# Patient Record
Sex: FEMALE | Race: WHITE
[De-identification: names, ages, dates, MRNs, and addresses within clinical notes are randomized per-mention and may not be internally consistent; named-entity substitution may affect disease eponyms.]

---

## 2017-04-01 ENCOUNTER — HOSPITAL ENCOUNTER (EMERGENCY)
Dept: HOSPITAL 61 - ER | Age: 32
Discharge: HOME | End: 2017-04-01
Payer: SELF-PAY

## 2017-04-01 VITALS — DIASTOLIC BLOOD PRESSURE: 80 MMHG | SYSTOLIC BLOOD PRESSURE: 131 MMHG

## 2017-04-01 VITALS — BODY MASS INDEX: 30.49 KG/M2 | WEIGHT: 183 LBS | HEIGHT: 65 IN

## 2017-04-01 DIAGNOSIS — Z90.710: ICD-10-CM

## 2017-04-01 DIAGNOSIS — Z90.711: ICD-10-CM

## 2017-04-01 DIAGNOSIS — G89.29: ICD-10-CM

## 2017-04-01 DIAGNOSIS — Z98.51: ICD-10-CM

## 2017-04-01 DIAGNOSIS — R11.0: ICD-10-CM

## 2017-04-01 DIAGNOSIS — R10.11: Primary | ICD-10-CM

## 2017-04-01 DIAGNOSIS — F12.10: ICD-10-CM

## 2017-04-01 LAB
ALBUMIN SERPL-MCNC: 4.5 G/DL (ref 3.4–5)
ALP SERPL-CCNC: 64 U/L (ref 46–116)
ALT SERPL-CCNC: 35 U/L (ref 14–59)
ANION GAP SERPL CALC-SCNC: 8 MMOL/L (ref 6–14)
AST SERPL-CCNC: 17 U/L (ref 15–37)
BACTERIA #/AREA URNS HPF: (no result) /HPF
BASOPHILS # BLD AUTO: 0.1 X10^3/UL (ref 0–0.2)
BASOPHILS NFR BLD: 1 % (ref 0–3)
BILIRUB DIRECT SERPL-MCNC: 0.2 MG/DL (ref 0–0.2)
BILIRUB SERPL-MCNC: 0.9 MG/DL (ref 0.2–1)
BILIRUB UR QL STRIP: NEGATIVE
BUN SERPL-MCNC: 11 MG/DL (ref 7–20)
CALCIUM SERPL-MCNC: 9.3 MG/DL (ref 8.5–10.1)
CHLORIDE SERPL-SCNC: 101 MMOL/L (ref 98–107)
CO2 SERPL-SCNC: 29 MMOL/L (ref 21–32)
CREAT SERPL-MCNC: 0.8 MG/DL (ref 0.6–1)
EOSINOPHIL NFR BLD: 2 % (ref 0–3)
ERYTHROCYTE [DISTWIDTH] IN BLOOD BY AUTOMATED COUNT: 13.2 % (ref 11.5–14.5)
GFR SERPLBLD BASED ON 1.73 SQ M-ARVRAT: 83.1 ML/MIN
GLUCOSE SERPL-MCNC: 101 MG/DL (ref 70–99)
GLUCOSE UR STRIP-MCNC: NEGATIVE MG/DL
HCT VFR BLD CALC: 42.4 % (ref 36–47)
HGB BLD-MCNC: 14.7 G/DL (ref 12–15.5)
LYMPHOCYTES # BLD: 3.1 X10^3/UL (ref 1–4.8)
LYMPHOCYTES NFR BLD AUTO: 35 % (ref 24–48)
MCH RBC QN AUTO: 32 PG (ref 25–35)
MCHC RBC AUTO-ENTMCNC: 35 G/DL (ref 31–37)
MCV RBC AUTO: 93 FL (ref 79–100)
MONOCYTES NFR BLD: 9 % (ref 0–9)
NEUTROPHILS NFR BLD AUTO: 54 % (ref 31–73)
NITRITE UR QL STRIP: NEGATIVE
PH UR STRIP: 6.5 [PH]
PLATELET # BLD AUTO: 277 X10^3/UL (ref 140–400)
POTASSIUM SERPL-SCNC: 3.7 MMOL/L (ref 3.5–5.1)
PROT SERPL-MCNC: 7.7 G/DL (ref 6.4–8.2)
PROT UR STRIP-MCNC: NEGATIVE MG/DL
RBC # BLD AUTO: 4.55 X10^6/UL (ref 3.5–5.4)
RBC #/AREA URNS HPF: (no result) /HPF (ref 0–2)
SODIUM SERPL-SCNC: 138 MMOL/L (ref 136–145)
SP GR UR STRIP: 1.01
SQUAMOUS #/AREA URNS LPF: (no result) /LPF
UROBILINOGEN UR-MCNC: 1 MG/DL
WBC # BLD AUTO: 8.9 X10^3/UL (ref 4–11)
WBC #/AREA URNS HPF: (no result) /HPF (ref 0–4)

## 2017-04-01 PROCEDURE — 83690 ASSAY OF LIPASE: CPT

## 2017-04-01 PROCEDURE — 80076 HEPATIC FUNCTION PANEL: CPT

## 2017-04-01 PROCEDURE — 81001 URINALYSIS AUTO W/SCOPE: CPT

## 2017-04-01 PROCEDURE — 96375 TX/PRO/DX INJ NEW DRUG ADDON: CPT

## 2017-04-01 PROCEDURE — 96374 THER/PROPH/DIAG INJ IV PUSH: CPT

## 2017-04-01 PROCEDURE — 99285 EMERGENCY DEPT VISIT HI MDM: CPT

## 2017-04-01 PROCEDURE — 76705 ECHO EXAM OF ABDOMEN: CPT

## 2017-04-01 PROCEDURE — 85027 COMPLETE CBC AUTOMATED: CPT

## 2017-04-01 PROCEDURE — 36415 COLL VENOUS BLD VENIPUNCTURE: CPT

## 2017-04-01 PROCEDURE — 96361 HYDRATE IV INFUSION ADD-ON: CPT

## 2017-04-01 PROCEDURE — 80048 BASIC METABOLIC PNL TOTAL CA: CPT

## 2017-04-01 NOTE — ED.ADGEN
Past Medical History


Past Medical History:  Other


Additional Past Medical Histor:  esophageal spasms, chronic back pain


Past Surgical History:  Tubal ligation


Alcohol Use:  Rarely


Drug Use:  Marijuana





Adult General


Chief Complaint


Chief Complaint:  ABDOMINAL PAIN





HPI


HPI


Patient is a 31  year old female presents emergency Department with a 2-3 hour 

history of severe right upper quadrant pain. The patient states that the pain 

radiates to her back and around both flanks. She has had nausea but no gabriela 

vomiting. She denies any fever or chills. She states that she does have a 

history of esophageal spasm but has never had pain this severe. She denies any 

fevers, chills, dysuria, diarrhea, constipation. She has history of partial 

hysterectomy. She denies any other surgical history. She did not have lunch. 

She did have sausage and eggs for breakfast.





Review of Systems


Review of Systems


Constitutional:  Denies fever or chills. []


Eyes:  Denies change in visual acuity. []


HENT:  Denies nasal congestion or sore throat. [] 


Respiratory:  Denies cough or shortness of breath. [] 


Cardiovascular:  Denies chest pain or edema. [] 


GI:  Denies abdominal pain, nausea, vomiting, bloody stools or diarrhea. [] 


:  Denies dysuria. [] 


Musculoskeletal:  Denies back pain or joint pain. [] 


Integument:  Denies rash. [] 


Neurologic:  Denies headache, focal weakness or sensory changes. [] 


Endocrine:  Denies polyuria or polydipsia. [] 


Lymphatic:  Denies swollen glands. [] 


Psychiatric:  Denies depression or anxiety. []





Current Medications


Current Medications





 Current Medications








 Medications


  (Trade)  Dose


 Ordered  Sig/[place]  Start Time


 Stop Time Status Last Admin


Dose Admin


 


 Diazepam 5 mg  5 mg  1X  ONCE  4/1/17 18:15


 4/1/17 18:16 DC 4/1/17 18:06


5 MG


 


 Hydromorphone HCl


  (Dilaudid)  0.5 mg  1X  ONCE  4/1/17 18:15


 4/1/17 18:16 DC 4/1/17 18:07


0.5 MG


 


 Ondansetron HCl


  (Zofran)  4 mg  1X  ONCE  4/1/17 18:15


 4/1/17 18:16 DC 4/1/17 18:04


4 MG


 


 Sodium Chloride


  (Iv Sodium


 Chloride 0.9%


 1000ml Bag)  1,000 ml @ 


 1,000 mls/hr  1X  ONCE  4/1/17 18:15


 4/1/17 19:14 DC 4/1/17 18:03


1,000 MLS/HR











Allergies


Allergies





 Allergies








Coded Allergies Type Severity Reaction Last Updated Verified


 


  No Known Drug Allergies    4/1/17 No











Physical Exam


Physical Exam





Constitutional: Well developed, well nourished, moderate acute distress, non-

toxic appearance. []


HENT: Normocephalic, atraumatic, bilateral external ears normal, oropharynx 

moist, no oral exudates, nose normal. []


Eyes: PERRLA, EOMI, conjunctiva normal, no discharge. [] 


Neck: Normal range of motion, no tenderness, supple, no stridor. [] 


Cardiovascular:Heart rate regular rhythm, no murmur []


Lungs & Thorax:  Bilateral breath sounds clear to auscultation []


Abdomen: Bowel sounds normal, soft, right upper quadrant tenderness to 

palpation without peritoneal signs, no masses, no pulsatile masses. [] 


Skin: Warm, dry, no erythema, no rash. [] 


Back: No tenderness, no CVA tenderness. [] 


Extremities: No tenderness, no cyanosis, no clubbing, ROM intact, no edema. [] 


Neurologic: Alert and oriented X 3, normal motor function, normal sensory 

function, no focal deficits noted. []


Psychologic: Affect normal, judgement normal, mood normal. []





Current Patient Data


Vital Signs





 Vital Signs








  Date Time  Temp Pulse Resp B/P Pulse Ox O2 Delivery O2 Flow Rate FiO2


 


4/1/17 19:00  64 16 131/80 100   


 


4/1/17 18:02      Room Air  


 


4/1/17 17:17 96.6       





 96.6       








Lab Values





 Laboratory Tests








Test


  4/1/17


17:20 4/1/17


17:30


 


Urine Collection Type Unknown   


 


Urine Color Yellow   


 


Urine Clarity Clear   


 


Urine pH 6.5   


 


Urine Specific Gravity 1.015   


 


Urine Protein


  Negativemg/dL


(NEG-TRACE) 


 


 


Urine Glucose (UA)


  Negativemg/dL


(NEG) 


 


 


Urine Ketones (Stick)


  Negativemg/dL


(NEG) 


 


 


Urine Blood


  Negative (NEG)


  


 


 


Urine Nitrite


  Negative (NEG)


  


 


 


Urine Bilirubin


  Negative (NEG)


  


 


 


Urine Urobilinogen Dipstick


  1.0mg/dL (0.2


mg/dL) 


 


 


Urine Leukocyte Esterase


  Negative (NEG)


  


 


 


Urine RBC


  Rare/HPF (0-2)


  


 


 


Urine WBC


  Rare/HPF (0-4)


  


 


 


Urine Squamous Epithelial


Cells Mod/LPF  


  


 


 


Urine Bacteria


  Few/HPF


(0-FEW) 


 


 


Urine Mucus Mod/LPF   


 


White Blood Count


  


  8.9x10^3/uL


(4.0-11.0)


 


Red Blood Count


  


  4.55x10^6/uL


(3.50-5.40)


 


Hemoglobin


  


  14.7g/dL


(12.0-15.5)


 


Hematocrit


  


  42.4%


(36.0-47.0)


 


Mean Corpuscular Volume  93fL ()  


 


Mean Corpuscular Hemoglobin  32pg (25-35)  


 


Mean Corpuscular Hemoglobin


Concent 


  35g/dL (31-37)


 


 


Red Cell Distribution Width


  


  13.2%


(11.5-14.5)


 


Platelet Count


  


  277x10^3/uL


(140-400)


 


Neutrophils (%) (Auto)  54% (31-73)  


 


Lymphocytes (%) (Auto)  35% (24-48)  


 


Monocytes (%) (Auto)  9% (0-9)  


 


Eosinophils (%) (Auto)  2% (0-3)  


 


Basophils (%) (Auto)  1% (0-3)  


 


Neutrophils # (Auto)


  


  4.8x10^3uL


(1.8-7.7)


 


Lymphocytes # (Auto)


  


  3.1x10^3/uL


(1.0-4.8)


 


Monocytes # (Auto)


  


  0.8x10^3/uL


(0.0-1.1)


 


Eosinophils # (Auto)


  


  0.2x10^3/uL


(0.0-0.7)


 


Basophils # (Auto)


  


  0.1x10^3/uL


(0.0-0.2)


 


Sodium Level


  


  138mmol/L


(136-145)


 


Potassium Level


  


  3.7mmol/L


(3.5-5.1)


 


Chloride Level


  


  101mmol/L


()


 


Carbon Dioxide Level


  


  29mmol/L


(21-32)


 


Anion Gap  8 (6-14)  


 


Blood Urea Nitrogen


  


  11mg/dL (7-20)


 


 


Creatinine


  


  0.8mg/dL


(0.6-1.0)


 


Estimated GFR


(Cockcroft-Gault) 


  83.1  


 


 


Glucose Level


  


  101mg/dL


(70-99)  H


 


Calcium Level


  


  9.3mg/dL


(8.5-10.1)


 


Total Bilirubin


  


  0.9mg/dL


(0.2-1.0)


 


Direct Bilirubin


  


  0.2mg/dL


(0.0-0.2)


 


Aspartate Amino Transferase


(AST) 


  17U/L (15-37)  


 


 


Alanine Aminotransferase (ALT)  35U/L (14-59)  


 


Alkaline Phosphatase


  


  64U/L ()


 


 


Total Protein


  


  7.7g/dL


(6.4-8.2)


 


Albumin


  


  4.5g/dL


(3.4-5.0)


 


Lipase


  


  116U/L


()





 Laboratory Tests


4/1/17 17:30








 Laboratory Tests


4/1/17 17:30














EKG


EKG


[]





Radiology/Procedures


Radiology/Procedures


PROCEDURE 


Abdomen sonogram limited. 


 


HISTORY 


Right upper quadrant pain. 


 


TECHNIQUE 


Sonographic imaging of the abdomen was performed 


 


COMPARISON 


None. 


 


FINDINGS 


The exam is limited due to bowel gas. The liver is normal in size. No 


focal hepatic lesion is seen. There is a 5 mm nonmobile echogenic lesion 


along the gallbladder wall, likely a polyp. There is no gallbladder wall 


thickening or pericholecystic fluid. There is a positive sonographic 


Zavala sign. The common bile duct is normal in caliber, measuring 4.2 mm. 


The right kidney is obscured due to bowel gas. The visualized portions of 


the pancreas and inferior vena cava are unremarkable. 


 


IMPRESSION 


1. Positive sonographic Zavala sign. There are no secondary sonographic 


findings to suggest cholecystitis. 


2. Suspected 5 mm gallbladder polyp. 


3. Limited exam due to bowel gas. 


 


Electronically signed by: Charu Saunders (Apr 01, 2017 19:20:21)














DICTATED and SIGNED BY:     CHARU SAUNDERS MD


DATE:     04/01/17 1920





CC: MINA MCKAY MD; DERRICK LOVELACE


[]





Course & Med Decision Making


Course & Med Decision Making


Pertinent Labs and Imaging studies reviewed. (See chart for details)


We were able to get her pain under control. Her lab work was very reassuring. 

Her CT results are as above. Patient was given supportive care as well as follow

-up instructions. She is also given prescriptions for Norco and Zofran.


[]





Dragon Disclaimer


Dragon Disclaimer


This electronic medical record was generated, in whole or in part, using a 

voice recognition dictation system.








MINA MCKAY MD Apr 1, 2017 18:07

## 2017-04-01 NOTE — RAD
PROCEDURE 

Abdomen sonogram limited. 

 

HISTORY 

Right upper quadrant pain. 

 

TECHNIQUE 

Sonographic imaging of the abdomen was performed 

 

COMPARISON 

None. 

 

FINDINGS 

The exam is limited due to bowel gas. The liver is normal in size. No 

focal hepatic lesion is seen. There is a 5 mm nonmobile echogenic lesion 

along the gallbladder wall, likely a polyp. There is no gallbladder wall 

thickening or pericholecystic fluid. There is a positive sonographic 

Zavala sign. The common bile duct is normal in caliber, measuring 4.2 mm. 

The right kidney is obscured due to bowel gas. The visualized portions of 

the pancreas and inferior vena cava are unremarkable. 

 

IMPRESSION 

1. Positive sonographic Zavala sign. There are no secondary sonographic 

findings to suggest cholecystitis. 

2. Suspected 5 mm gallbladder polyp. 

3. Limited exam due to bowel gas. 

 

Electronically signed by: Charu Clark (Apr 01, 2017 19:20:21)

## 2018-11-11 NOTE — XMS REPORT
Continuity of Care Document

 Created on: 2018



OCTAVIA HAYNES

External Reference #: [id number]

: 1985

Sex: Female



Demographics







 Address  [address] [place]  [address]

 

 Home Phone  [phone] x

 

 Preferred Language  Unknown

 

 Marital Status  Unknown

 

 Yarsanism Affiliation  Unknown

 

 Race  Unknown

 

 Ethnic Group  Unknown





Author







 Author  [facility]

 

 Address  Unknown

 

 Phone  Unavailable



              



Allergies

      





 Active            Description            Code            Type            
Severity            Reaction            Onset            Reported/Identified   
         Relationship to Patient            Clinical Status        

 

 Yes            NO KNOWN DRUG ALLERGIES                                      
UNKNOWN            NO KNOWN DRUG ALLERG                                 
                          

 

 Yes            No Known Drug Allergies            [id number]            Drug 
Allergy            Unknown            N/A                         2007   
                               

 

 Yes            amitriptyline                         Drug Allergy            N/
A            N/A                         2011                            
      



                      



Medications

      





 Medication            Packaging            Start Date            Stop Date    
        Route            Dosage            Sig        

 

             KETOROLAC VIAL INJ 30 MG/CC (TORADOL VIAL)                      MG
            2017                                   
               ONCE&1329                  

 

             PROCHLORPERAZINE VIAL INJ 10 MG/2CC (COMPAZINE VIAL)              
        MG            2017                         
                         PRN ONCE                  

 

             LEVOFLOXACIN PREMIX IV BAG  MG (LEVAQUIN PREMIX IV BAG)    
                  MG            2017               
                                   ONCE&1512                  



                      



Problems

      





 Date Dx Coded            Attending            Type            Code            
Diagnosis            Diagnosed By        

 

 2008                         Ot            641.93                       
           

 

 2008                         Ot            644.03                       
           

 

 2008                         Ot            646.83                       
           

 

 2008                         Ot            789.00                       
           

 

 2008                         Ot            847.2                        
          

 

 2008                         Ot            E927.0                       
           

 

 2008                         Ot            V57.1            PASNGR IN PK-
UP/VAN INJURED IN CLSN W ST                     

 

 2010                         Ot            459.89            CIRCULATORY 
DISEASE NEC                     

 

 2010                         Ot            655.73            DECR FETAL 
MOVEMNT ANTEPARTUM CONDITION                      

 

 2011                         Ot            599.0            URIN TRACT 
INFECTION NOS                     

 

 2011                         Ot            646.63             INFECTION
-ANTEPARTUM                     

 

 2011                         Ot            646.83            PREG COMPL 
NEC-ANTEPART                     

 

 2011                         Ot            787.03            VOMITING 
ALONE                     

 

 2011                         Ot            648.81            ABN GLUCOSE 
LUISA-DELIV                     

 

 2011                         Ot            648.91            OTH CURR 
COND-DELIVERED                     

 

 2011                         Ot            V02.51            GROUP B 
STREPT CARRIER/SUSPECTED CARRIER                     

 

 2011                         Ot            V06.1            DIPHTHERIA-
TETANUS-PERTUSSIS, COMBINED [                     

 

 2011                         Ot            V27.0            DELIVER-
SINGLE LIVEBORN                     

 

 03/15/2011            LYNDA SOLORZANO DDS                         782.0      
      DISTURBANCE OF SKIN SENSATION                     

 

 2011            LYNDA SOLORZANO DDS                         729.5      
      LEG PAIN                     

 

 2011            RASHAD PEREA, LYNDA                         719.40     
       ARTHRAIGIA UNSPEC                     

 

 2011            RASHAD PEREA, LYNDA                         719.45     
       HIP PAIN                     

 

 2011            RASHAD PEREA, LYNDA                         736.81     
       UNEQUAL LEG LENGTH (ACQUIRED)                     

 

 2012            LYNDA SOLORZANO DDS                         616.10     
       VAGINITIS VULVOVAGINITIS UNSPECIFIED                     

 

 2012            RASHAD PEREA, LYNDA                         V65.45     
       STD COUNSELING                     

 

 2012            LYNDA SOLORZANO DDS                         V74.5      
      STD SCREEN                     

 

 2012            LYNDA SOLORZANO DDS                         V76.2      
      CERVICAL CANCER SCREENING (PAP SMEAR)                     

 

 2012                         Ot            623.8            NONINFLAM 
DIS VAGINA NEC                     

 

 2012                         Ot            780.09            OTHER 
ALTERATION OF CONSCIOUSNESS                     

 

 2012                         Ot            787.02            NAUSEA 
ALONE                     

 

 2012            LYNDA SOLORZANO DDS                         311        
    DEPRESSIVE DISORDER NOS                     

 

 2012                         Ot            922.1            CONTUSION OF 
CHEST WALL                     

 

 2012                         Ot            959.11            OT INJURY 
OF CHEST WALL                     

 

 2012                         Ot            E000.8            OTHER 
EXTERNAL CAUSE STATUS                     

 

 2012                         Ot            E815.0            MV DHAVAL W 
OT OBJ-                     

 

 2013            LUIS TIRADO DO            Ot            923.20         
   CONTUSION OF HAND(S)                     

 

 2013            LUIS TIRADO DO            Ot            959.4          
  HAND INJURY NOS                     

 

 2013            LUIS TIRADO DO            Ot            E000.8         
   OTHER EXTERNAL CAUSE STATUS                     

 

 2013            LUIS TIRADO DO            Ot            E849.0         
   ACCIDENT IN HOME                     

 

 2013            LUIS TIRADO DO            Ot            E917.4         
   STAT OB W/O SUB FALL NEC                     

 

 2013            ASCENCION MUJICA DO            Ot            599.0    
        URIN TRACT INFECTION NOS                     

 

 2013            ASCENCION MUJICA DO            Ot            620.2    
        OVARIAN CYST NEC/NOS                     

 

 2013            ASCENCION MUJICA DO            Ot            789.09   
         ABDOMINAL PAIN, OTHER SPECIFIED SITE                     

 

 2013            BARNEY DE JESUS DO            Ot            614.1     
       CHR SALPINGO-OOPHORITIS                     

 

 2013            BARNEY DE JESUS DO            Ot            620.2     
       OVARIAN CYST NEC/NOS                     

 

 2013            SRIKANTH NEGRO            Ot            338.18   
         OTHER ACUTE POSTOPERATIVE PAIN                     

 

 2013            SRIKANTH NEGRO            Ot            518.0    
        PULMONARY COLLAPSE                     

 

 2013            SRIKANTH NEGRO            Ot            786.50   
         CHEST PAIN NOS                     

 

 2015            ELISA GRACIA, CASSIUS SR            Ot            599.0      
      URIN TRACT INFECTION NOS                     

 

 2015            ELISA GRACIA, CASSIUS SR            Ot            789.00     
       ABDOMINAL PAIN, UNSPECIFIED SITE                     

 

 2015                         Ot            649.63                       
           

 

 2015                         Ot            659.73                       
           

 

 2015                         Ot            649.63                       
           

 

 2015                         Ot            649.63                       
           

 

 2015                         Ot            781.2                        
          

 

 2015                         Ot            782.0                        
          

 

 2015                         Ot            625.9                        
          

 

 2015            BARNEY DE JESUS DO            Ot            620.2     
                             

 

 2015            BARNEY DE JESUS DO            Ot            V72.84    
                              

 

 2015                         Ot            649.63                       
           

 

 2015                         Ot            659.73                       
           

 

 2015                         Ot            649.63                       
           

 

 2015                         Ot            649.63                       
           

 

 2015                         Ot            781.2                        
          

 

 2015                         Ot            782.0                        
          

 

 2015                         Ot            625.9                        
          

 

 2015            BARNEY DE JESUS DO            Ot            620.2     
                             

 

 2015            BARNEY DE JESUS DO            Ot            V72.84    
                              

 

 2015            VIRIDIANA TAYLOR MD            Ot            626.8       
                           

 

 2015            VIRIDIANA TAYLOR MD            Ot            626.8       
                           

 

 2015            VIRIDIANA TAYLOR MD            Ot            626.8       
                           

 

 2015            BARNEY DE JESUS DO            Ot            620.2     
                             

 

 2015            BARNEY DE JESUS DO            Ot            V72.84    
                              

 

 2015            VIRIDIANA TAYLOR MD            Ot            626.8       
                           

 

 2016                         Ot            F12.10            CANNABIS 
ABUSE, UNCOMPLICATED                     

 

 2016                         Ot            F17.210            NICOTINE 
DEPENDENCE, CIGARETTES, UNCOMPL                     

 

 2016                         Ot            N20.0            CALCULUS OF 
KIDNEY                     

 

 2016            SHANA CHANCE            Ot            R07.89     
       OTHER CHEST PAIN                     

 

 2016            SHANA CHANCE            Ot            R07.89     
                             

 

 2016            SRIKANTH NEGRO            Ot            F17.210  
          NICOTINE DEPENDENCE, CIGARETTES, UNCOMPL                     

 

 2016            SRIKANTH NEGRO            Ot            L50.9    
        URTICARIA, UNSPECIFIED                     

 

 2016            SRIKANTH NEGRO            Ot            L50.9    
        URTICARIA, UNSPECIFIED                     

 

 2016            MILTONPAOLA            Ot            F17.210         
   NICOTINE DEPENDENCE, CIGARETTES, UNCOMPL                     

 

 2016            MILTONPAOLA YIN            Ot            K08.9            
DISORDER OF TEETH AND SUPPORTING STRUCTU                     

 

 2016            MILTONPAOLA YIN            Ot            S02.5XXA        
    FRACTURE OF TOOTH (TRAUMATIC), INIT FOR                      

 

 2016            MILTONPAOLA YIN            Ot            X58.XXXA        
    EXPOSURE TO OTHER SPECIFIED FACTORS, INI                     

 

 2016            MILTONPAOLA YIN            Ot            Y99.8            
OTHER EXTERNAL CAUSE STATUS                     

 

 2016            MILTONPAOLA YIN            Ot            F17.210         
   NICOTINE DEPENDENCE, CIGARETTES, UNCOMPL                     

 

 2016            MILTONPAOLA YIN            Ot            K08.9            
DISORDER OF TEETH AND SUPPORTING STRUCTU                     

 

 2016            MILTONPAOLA YIN            Ot            S02.5XXA        
    FRACTURE OF TOOTH (TRAUMATIC), INIT FOR                      

 

 2016            MILTONPAOLA YIN            Ot            X58.XXXA        
    EXPOSURE TO OTHER SPECIFIED FACTORS, INI                     

 

 2016            MILTONPAOLA YIN            Ot            Y99.8            
OTHER EXTERNAL CAUSE STATUS                     

 

 12/15/2016            MAE GRACIA, RICH MITCHELL            Ot            
F17.210            NICOTINE DEPENDENCE, CIGARETTES, UNCOMPL                     

 

 12/15/2016            RICH WALL MD            Ot            
T63.391A            TOXIC EFFECT OF VENOM OF SPIDER, ACCIDEN                   
  

 

 12/15/2016            RICH WALL MD            Ot            
F17.210            NICOTINE DEPENDENCE, CIGARETTES, UNCOMPL                     

 

 12/15/2016            RICH WALL MD            Ot            
T63.391A            TOXIC EFFECT OF VENOM OF SPIDER, ACCIDEN                   
  

 

 2017            PAOLA ROSE            Ot            F17.210         
   NICOTINE DEPENDENCE, CIGARETTES, UNCOMPL                     

 

 2017            PAOLA ROSE            Ot            K08.9            
DISORDER OF TEETH AND SUPPORTING STRUCTU                     

 

 2017            PAOLA ROSE            Ot            S02.5XXA        
    FRACTURE OF TOOTH (TRAUMATIC), INIT FOR                      

 

 2017            PAOLA ROSE            Ot            X58.XXXA        
    EXPOSURE TO OTHER SPECIFIED FACTORS, INI                     

 

 2017            PAOLA ROSE            Ot            Y99.8            
OTHER EXTERNAL CAUSE STATUS                     

 

 2017            DALE GRACIA, CHIOMA RODRÍGUEZ            Ot            K21.9     
       GASTRO-ESOPHAGEAL REFLUX DISEASE WITHOUT                     

 

 2017            CHIOMA HUNTER MD            Ot            R13.10    
        DYSPHAGIA, UNSPECIFIED                     

 

 2017            DALE GRACIA, CHIOMA RODRÍGUEZ            Ot            Z01.818   
         ENCOUNTER FOR OTHER PREPROCEDURAL EXAMIN                     

 

 2017                         Ot            649.63            UTERINE 
SIZE DATE DISCREPANCY, ANTEPARTU                     

 

 2017                         Ot            659.73            ABN FET HT 
RT/RHYTHM,ANTEPARTUM COND OR                      

 

 2017                         Ot            649.63            UTERINE 
SIZE DATE DISCREPANCY, ANTEPARTU                     

 

 2017                         Ot            649.63            UTERINE 
SIZE DATE DISCREPANCY, ANTEPARTU                     

 

 2017                         Ot            781.2            ABNORMALITY 
OF GAIT                     

 

 2017                         Ot            782.0            SKIN 
SENSATION DISTURB                     

 

 2017                         Ot            625.9            FEM GENITAL 
SYMPTOMS NOS                     

 

 2017            FENECH DO, BARNEY S            Ot            620.2     
       OVARIAN CYST NEC/NOS                     

 

 2017            FENECH DO BARNEY S            Ot            V72.84    
        EXAM PRE-OPERATIVE NOS                     

 

 2017            BRANDON GRACIA, VIRIDIANA RAY            Ot            626.8       
     MENSTRUAL DISORDER NEC                     

 

 2017            DALE GRACIA, CHIOMA RODRÍGUEZ            Ot            K20.9     
       ESOPHAGITIS, UNSPECIFIED                     

 

 2017            CHIOMA HUNTER MD            Ot            K25.9     
       GASTRIC ULCER, UNSP AS ACUTE OR CHRONIC,                     

 

 2017            CHIOMA HUNTER MD            Ot            K29.70    
        GASTRITIS, UNSPECIFIED, WITHOUT BLEEDING                     

 

 2017                         Ot            625.9            FEM GENITAL 
SYMPTOMS NOS                     

 

 2017            FENECH DO, BARNEY S            Ot            620.2     
       OVARIAN CYST NEC/NOS                     

 

 2017            FENECH DO BARNEY S            Ot            V72.84    
        EXAM PRE-OPERATIVE NOS                     

 

 2017            BRANDON GRACIA, VIRIDIANA RAY            Ot            626.8       
     MENSTRUAL DISORDER NEC                     

 

 2017            CASSIUS PÉREZ MD            Ot            K20.9      
      ESOPHAGITIS, UNSPECIFIED                     

 

 2017            CASSIUS PÉREZ MD            Ot            R10.13     
       EPIGASTRIC PAIN                     

 

 2017                         Ot            625.9            FEM GENITAL 
SYMPTOMS NOS                     

 

 2017            BARNEY DE JESUS DO            Ot            620.2     
       OVARIAN CYST NEC/NOS                     

 

 2017            BARNEY DE JESUS DO            Ot            V72.84    
        EXAM PRE-OPERATIVE NOS                     

 

 2017            BRANDON GRACIA, VIRIDIANA RAY            Ot            626.8       
     MENSTRUAL DISORDER NEC                     

 

 2017                         Ot            F12.10            CANNABIS 
ABUSE, UNCOMPLICATED                     

 

 2017                         Ot            F17.210            NICOTINE 
DEPENDENCE, CIGARETTES, UNCOMPL                     

 

 2017                         Ot            N20.0            CALCULUS OF 
KIDNEY                     

 

 2017            SHANA CHANCE            Ot            R07.89     
       OTHER CHEST PAIN                     

 

 2017            LUIS TIRADO DO            Ot            R10.9          
  UNSPECIFIED ABDOMINAL PAIN                     

 

 2017                         Ot            625.9            FEM GENITAL 
SYMPTOMS NOS                     

 

 2017            BARNEY DE JESUS DO            Ot            620.2     
       OVARIAN CYST NEC/NOS                     

 

 2017            BARNEY DE JESUS DO            Ot            V72.84    
        EXAM PRE-OPERATIVE NOS                     

 

 2017            BRANDON GRACIA, VIRIDIANA RAY            Ot            626.8       
     MENSTRUAL DISORDER NEC                     

 

 2017            CASSIUS PÉREZ MD            Ot            K20.9      
      ESOPHAGITIS, UNSPECIFIED                     

 

 2017            CASSIUS PÉREZ MD            Ot            R10.13     
       EPIGASTRIC PAIN                     

 

 2017            LUIS TIRADO DO            Ot            R10.9          
  UNSPECIFIED ABDOMINAL PAIN                     



                                                                               
                                                                               
                                                                               
                                                         



Procedures

      





 Code            Description            Performed By            Performed On   
     

 

             73.59                                  MANUAL ASSIST DELIV NEC    
                               2008        

 

             73.1                                  SURG INDUCT LABOR NEC       
                            2011        

 

             73.59                                  MANUAL ASSIST DELIV NEC    
                               2011        



                      



Results

      





 Test            Result            Range        









 Genital Culture, Routine - 17 15:00         









 Genital Culture, Routine            Note                      









 Complete blood count (CBC) with automated white blood cell (WBC) differential 
- 17 14:33         









 Blood leukocytes automated count (number/volume)            5.9 10*3/uL       
     4.3-11.0        

 

 Blood erythrocytes automated count (number/volume)            4.40 10*6/uL    
        4.35-5.85        

 

 Venous blood hemoglobin measurement (mass/volume)            13.7 g/dL        
    11.5-16.0        

 

 Blood hematocrit (volume fraction)            41 %            35-52        

 

 Automated erythrocyte mean corpuscular volume            92 [foz_us]          
  80-99        

 

 Automated erythrocyte mean corpuscular hemoglobin (mass per erythrocyte)      
      31 pg            25-34        

 

 Automated erythrocyte mean corpuscular hemoglobin concentration measurement (
mass/volume)            34 g/dL            32-36        

 

 Automated erythrocyte distribution width ratio            12.3 %            
10.0-14.5        

 

 Automated blood platelet count (count/volume)            253 10*3/uL          
  130-400        

 

 Automated blood platelet mean volume measurement            9.8 [foz_us]      
      7.4-10.4        

 

 Automated blood neutrophils/100 leukocytes            51 %            42-75   
     

 

 Automated blood lymphocytes/100 leukocytes            37 %            12-44   
     

 

 Blood monocytes/100 leukocytes            11 %            0-12        

 

 Automated blood eosinophils/100 leukocytes            1 %            0-10     
   

 

 Automated blood basophils/100 leukocytes            0 %            0-10        

 

 Blood neutrophils automated count (number/volume)            3.0 10*3         
   1.8-7.8        

 

 Blood lymphocytes automated count (number/volume)            2.2 10*3         
   1.0-4.0        

 

 Blood monocytes automated count (number/volume)            0.7 10*3            
0.0-1.0        

 

 Automated eosinophil count            0.1 10*3/uL            0.0-0.3        

 

 Automated blood basophil count (count/volume)            0.0 10*3/uL          
  0.0-0.1        









 Comprehensive metabolic panel - 17 14:33         









 Serum or plasma sodium measurement (moles/volume)            140 mmol/L       
     135-145        

 

 Serum or plasma potassium measurement (moles/volume)            3.8 mmol/L    
        3.6-5.0        

 

 Serum or plasma chloride measurement (moles/volume)            106 mmol/L     
               

 

 Carbon dioxide            29 mmol/L            21-32        

 

 Serum or plasma anion gap determination (moles/volume)            5 mmol/L    
        5-14        

 

 Serum or plasma urea nitrogen measurement (mass/volume)            13 mg/dL   
         7-18        

 

 Serum or plasma creatinine measurement (mass/volume)            0.74 mg/dL    
        0.60-1.30        

 

 Serum or plasma urea nitrogen/creatinine mass ratio            18             
NRG        

 

 Serum or plasma creatinine measurement with calculation of estimated 
glomerular filtration rate            >             NRG        

 

 Serum or plasma glucose measurement (mass/volume)            86 mg/dL         
           

 

 Serum or plasma calcium measurement (mass/volume)            9.4 mg/dL        
    8.5-10.1        

 

 Serum or plasma total bilirubin measurement (mass/volume)            1.0 mg/dL
            0.1-1.0        

 

 Serum or plasma alkaline phosphatase measurement (enzymatic activity/volume)  
          53 U/L                    

 

 Serum or plasma aspartate aminotransferase measurement (enzymatic activity/
volume)            16 U/L            5-34        

 

 Serum or plasma alanine aminotransferase measurement (enzymatic activity/volume
)            27 U/L            0-55        

 

 Serum or plasma protein measurement (mass/volume)            7.0 g/dL         
   6.4-8.2        

 

 Serum or plasma albumin measurement (mass/volume)            4.7 g/dL         
   3.2-4.5        









 Lipase - 17 14:33         









 Lipase            31 U/L            8-78        









 Comprehensive Metabolic Panel - 17 13:29         









 Albumin            4.0 g/dL            3.6-5.1        

 

 ALP            61 U/L                    

 

 ALT            32 U/L            6-45        

 

 Anion Gap            12             6-14        

 

 AST            22 U/L            2-40        

 

 BUN            11 mg/dL            5-25        

 

 Calcium            9.0 mg/dL            8.3-10.4        

 

 Chloride            105 mmol/L                    

 

 CO2            24 mEq/L            22-33        

 

 Creat            0.67 mg/dL            0.50-1.50        

 

 eGFR            101 mL/min/1.73m2            >59        

 

 Globulin            2.4 g/dL            2.3-3.5        

 

 Glucose            122 mg/dL                    

 

 Osmo            284             280-295        

 

 Potassium            3.8 mmol/L            3.5-5.3        

 

 Sodium            137 mmol/L            134-148        

 

 TBil            1.2 mg/dL            0.2-1.2        

 

 TP            6.4 g/dL            6.0-8.3        









 Lipase - 17 13:29         









 Lipase            14 U/L            7-59        









 Urinalysis - 17 13:29         









 Icotest            N/A             Negative        

 

 Urine Volume            Urine Volume Sufficient (10mL)                      

 

 Urine Yeast            No Yeast present                      

 

 Urine-Appearance            Clear             Clear        

 

 Urine-Bacteria            Trace                      

 

 Urine-Bilirubin            Negative             Negative        

 

 Urine-Blood            Negative             Negative        

 

 Urine-Color            Yellow             Colorless-Lt. Yellow        

 

 Urine-Epithelial Cells            0-5/HPF                      

 

 Urine-Glucose            Negative             Negative        

 

 Urine-Ketones            1+             Negative        

 

 Urine-Leukocytes            Trace             Negative        

 

 Urine-Nitrite            Negative             Negative        

 

 Urine-Other             Urine Saved if Culture Needed (48hrs from time of 
collection)                      

 

 Urine-pH            7.5             5-8.5        

 

 Urine-Protein            Negative             Negative        

 

 Urine-RBC            Negative                      

 

 Urine-Specific Gravity            1.015             1.000-1.030        

 

 Urine-WBC            0-2/HPF                      

 

 Urobilinogen            0.2 E.U./dL             0.2-1.0        









 IFOBT Occult Blood - 17 13:29         









 IFOBT Occult Blood            NEGATIVE             Negative        









 CULTURE, GENITAL - 18 14:11         









 CULTURE, GENITAL            SEE NOTE             NRG        



                                



Encounters

      





 ACCT No.            Visit Date/Time            Discharge            Status    
        Pt. Type            Provider            Facility            Loc./Unit  
          Complaint        

 

 [id number]            04/10/2014 07:56:00            04/10/2014 23:59:59          
  CLS            Outpatient            [facility]                    
                           

 

 [id number]            2017 13:03:00            2017 16:32:00          
  [facility]            [facility]            ER                     

 

 [id number]            2017 13:33:07                                      
Document Registration                                                          
  

 

 [id number]            2015 12:49:25                         [place]            
Document Registration                                                          
  

 

 [id number]            2018 10:01:00            2018 23:59:59    
        CLS            Outpatient            BRANDON GRACIA, VIRIDIANA RAY            [facility]            RAD            RIGHT THUMB PAIN        

 

 [id number]            2017 12:07:00            2017 12:50:00    
        DIS            Emergency            LUIS TIRADO DO K            [facility] 
[facility]            ER            ABD PAIN        

 

 [id number]            2017 14:13:00            2017 16:32:00    
        DIS            Emergency            CASSIUS PÉREZ MD            [facility]            ER            ABD PAIN        

 

 [id number]            2017 09:48:00            2017 13:00:00    
        DIS            Outpatient            CHIOMA HUNTER MD            
[facility]            ENDO            GERD;DYSPHAGIA     
   

 

 [id number]            2017 05:52:00            2017 16:33:00    
        DIS            Outpatient            CHIOMA HUNTER MD            
[facility]            PREOP            GERD;DYSPHAGIA    
    

 

 [id number]            2016 23:29:00            12/15/2016 00:29:00    
        DIS            Emergency            RIHC WALL MD            
[facility]            ER            LEFT ARM,POSS SPIDER 
BITE        

 

 [id number]            2016 22:18:00            2016 22:43:00    
        DIS            Emergency            PAOLA ROSE            [facility]            ER            TOOTHACHE        

 

 [id number]            2016 12:03:00            2016 14:32:00    
        DIS            Emergency            SRIKANTH NEGRO            
[facility]            ER            HIVES/FACIAL SWELLING
        

 

 [id number]            2016 15:07:00            2016 16:16:00    
        DIS            Outpatient            SHANA CHANCE APRN            [facility]            ER            RIB PAIN        

 

 [id number]            2015 12:49:00            2015 23:59:59    
        CLS            Outpatient            VIRIDIANA TAYLOR MD            [facility]            RAD            DUB        

 

 [id number]            2015 06:24:00            2015 08:05:00    
        DIS            Emergency            CASSIUS PÉREZ MD            [facility]            ER            ABD PAIN        

 

 [id number]            2013 13:38:00            2013 16:50:00    
        DIS            Emergency            SRIKANTH NEGRO            
[facility]            ER            CHEST PAIN/TROUBLE 
BREATHING        

 

 [id number]            2013 07:50:00            2013 20:15:00    
        DIS            Outpatient            BARNEY DE JESUS DO S            
[facility]            SDC            OVARIAN CYSTS        

 

 [id number]            2013 12:43:00            2013 23:59:59    
        CLS            Outpatient            BARNEY DE JESUS DO S            
[facility]            PREOP            OVARIAN CYSTS     
   

 

 [id number]            2013 08:34:00            2013 12:00:00    
        DIS            Emergency            ASCENCION MUJICA DO            
[facility]            ER            ABD PAIN        

 

 [id number]            2013 19:25:00            2013 22:07:00    
        DIS            Emergency            LUIS TIRADO DO            [facility]            ER            L HAND INJ        

 

 [id number]            2017 16:23:00                                   
   Document Registration                                                       
     

 

 [id number]            2017 16:23:00                                   
   Document Registration                                                       
     

 

 [id number]            2017 16:23:00                                   
   Document Registration                                                       
     

 

 [id number]            2017 16:23:00                                   
   Document Registration                                                       
     

 

 [id number]            2017 16:20:00                                   
   Document Registration                                                       
     

 

 [id number]            2017 16:20:00                                   
   Document Registration                                                       
     

 

 [id number]            2017 16:20:00                                   
   Document Registration                                                       
     

 

 [id number]            2017 16:20:00                                   
   Document Registration                                                       
     

 

 [id number]            2017 16:20:00                                   
   Document Registration                                                       
     

 

 [id number]            2017 16:20:00                                   
   Document Registration                                                       
     

 

 [id number]            2017 16:20:00                                   
   Document Registration                                                       
     

 

 [id number]            2017 16:20:00                                   
   Document Registration                                                       
     

 

 [id number]            2017 16:20:00                                   
   Document Registration                                                       
     

 

 [id number]            2017 16:20:00                                   
   Document Registration                                                       
     

 

 [id number]            2017 16:20:00                                   
   Document Registration                                                       
     

 

 [id number]            2017 16:20:00                                   
   Document Registration                                                       
     

 

 [id number]            2017 16:20:00                                   
   Document Registration                                                       
     

 

 [id number]            2017 16:20:00                                   
   Document Registration                                                       
     

 

 [id number]            2016 11:13:00                                   
   Document Registration                                                       
     

 

 [id number]            2015 12:49:00                                   
   Document Registration                                                       
     

 

 [id number]            2015 12:49:00                                   
   Document Registration                                                       
     

 

 [id number]            2015 12:49:00                                   
   Document Registration                                                       
     

 

 [id number]            2013 14:01:00                                   
   Document Registration                                                       
     

 

 [id number]            2012 15:07:00                                   
   Document Registration                                                       
     

 

 [id number]            2011 12:55:00                                   
   Document Registration                                                       
     

 

 [id number]            2011 19:28:00                                   
   Document Registration                                                       
     

 

 [id number]            2011 11:32:00                                   
   Document Registration                                                       
     

 

 [id number]            2010 10:55:00                                   
   Document Registration                                                       
     

 

 [id number]            2010 09:36:00                                   
   Document Registration                                                       
     

 

 [id number]            2010 12:42:00                                   
   Document Registration                                                       
     

 

 [id number]            2010 10:45:00                                   
   Document Registration                                                       
     

 

 [id number]            2010 10:52:00                                   
   Document Registration                                                       
     

 

 [id number]            2008 08:40:00                                   
   Document Registration                                                       
     

 

 [id number]            04/15/2008 19:28:00                                   
   Document Registration                                                       
     

 

 [id number]            2008 23:35:00                                   
   Document Registration                                                       
     

 

 [id number]            2008 00:50:00                                   
   Document Registration                                                       
     

 

 [id number]            2008 18:15:00                                   
   Document Registration                                                       
     

 

 [id number]            2018 16:00:00            2018 23:59:59            
[facility]            Outpatient            GOSIA NAYAK [facility]                     

 

 [id number]            2018 11:40:00                                      
Document Registration                                                          
  

 

 [id number]            2017 16:05:00                                   
   Document Registration

## 2018-12-10 ENCOUNTER — HOSPITAL ENCOUNTER (OUTPATIENT)
Dept: HOSPITAL 75 - RAD | Age: 33
End: 2018-12-10
Attending: FAMILY MEDICINE
Payer: SELF-PAY

## 2018-12-10 DIAGNOSIS — M25.561: ICD-10-CM
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
## 2018-12-10 DIAGNOSIS — M40.202: Primary | ICD-10-CM

## 2018-12-10 DIAGNOSIS — M25.562: ICD-10-CM

## 2018-12-10 DIAGNOSIS — M79.631: ICD-10-CM

## 2018-12-10 PROCEDURE — 72040 X-RAY EXAM NECK SPINE 2-3 VW: CPT

## 2018-12-10 PROCEDURE — 73090 X-RAY EXAM OF FOREARM: CPT
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
## 2018-12-14 ENCOUNTER — HOSPITAL ENCOUNTER (OUTPATIENT)
Dept: HOSPITAL 75 - RAD | Age: 33
End: 2018-12-14
Attending: FAMILY MEDICINE
Payer: SELF-PAY

## 2018-12-14 DIAGNOSIS — M54.2: ICD-10-CM
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
## 2018-12-14 DIAGNOSIS — R51: Primary | ICD-10-CM

## 2018-12-14 PROCEDURE — 70450 CT HEAD/BRAIN W/O DYE: CPT

## 2018-12-14 PROCEDURE — 72125 CT NECK SPINE W/O DYE: CPT
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
## 2019-02-20 ENCOUNTER — HOSPITAL ENCOUNTER (OUTPATIENT)
Dept: HOSPITAL 75 - REHAB | Age: 34
LOS: 14 days | Discharge: HOME | End: 2019-03-06
Attending: FAMILY MEDICINE
Payer: MEDICAID

## 2019-02-20 DIAGNOSIS — M25.562: Primary | ICD-10-CM

## 2020-01-04 ENCOUNTER — HOSPITAL ENCOUNTER (OUTPATIENT)
Dept: HOSPITAL 75 - ER | Age: 35
Setting detail: OBSERVATION
LOS: 1 days | Discharge: HOME | End: 2020-01-05
Attending: FAMILY MEDICINE | Admitting: FAMILY MEDICINE
Payer: MEDICAID

## 2020-01-04 VITALS — DIASTOLIC BLOOD PRESSURE: 76 MMHG | SYSTOLIC BLOOD PRESSURE: 110 MMHG

## 2020-01-04 VITALS — SYSTOLIC BLOOD PRESSURE: 97 MMHG | DIASTOLIC BLOOD PRESSURE: 64 MMHG

## 2020-01-04 VITALS — HEIGHT: 65 IN | WEIGHT: 187.17 LBS | BODY MASS INDEX: 31.18 KG/M2

## 2020-01-04 VITALS — SYSTOLIC BLOOD PRESSURE: 96 MMHG | DIASTOLIC BLOOD PRESSURE: 51 MMHG

## 2020-01-04 VITALS — DIASTOLIC BLOOD PRESSURE: 78 MMHG | SYSTOLIC BLOOD PRESSURE: 118 MMHG

## 2020-01-04 DIAGNOSIS — T42.4X4A: ICD-10-CM

## 2020-01-04 DIAGNOSIS — F32.9: ICD-10-CM

## 2020-01-04 DIAGNOSIS — I10: ICD-10-CM

## 2020-01-04 DIAGNOSIS — S06.0X9A: ICD-10-CM

## 2020-01-04 DIAGNOSIS — T44.7X4A: Primary | ICD-10-CM

## 2020-01-04 DIAGNOSIS — Z79.899: ICD-10-CM

## 2020-01-04 DIAGNOSIS — M19.90: ICD-10-CM

## 2020-01-04 DIAGNOSIS — Z98.51: ICD-10-CM

## 2020-01-04 LAB
ALBUMIN SERPL-MCNC: 4.4 GM/DL (ref 3.2–4.5)
ALP SERPL-CCNC: 53 U/L (ref 40–136)
ALT SERPL-CCNC: 11 U/L (ref 0–55)
APAP SERPL-MCNC: < 10 UG/ML (ref 10–30)
APTT PPP: YELLOW S
BACTERIA #/AREA URNS HPF: NEGATIVE /HPF
BARBITURATES UR QL: NEGATIVE
BASOPHILS # BLD AUTO: 0 10^3/UL (ref 0–0.1)
BASOPHILS NFR BLD AUTO: 0 % (ref 0–10)
BENZODIAZ UR QL SCN: POSITIVE
BILIRUB SERPL-MCNC: 1.1 MG/DL (ref 0.1–1)
BILIRUB UR QL STRIP: NEGATIVE
BUN/CREAT SERPL: 15
CALCIUM SERPL-MCNC: 9 MG/DL (ref 8.5–10.1)
CHLORIDE SERPL-SCNC: 108 MMOL/L (ref 98–107)
CO2 SERPL-SCNC: 21 MMOL/L (ref 21–32)
COCAINE UR QL: NEGATIVE
CREAT SERPL-MCNC: 0.67 MG/DL (ref 0.6–1.3)
EOSINOPHIL # BLD AUTO: 0.1 10^3/UL (ref 0–0.3)
EOSINOPHIL NFR BLD AUTO: 2 % (ref 0–10)
ERYTHROCYTE [DISTWIDTH] IN BLOOD BY AUTOMATED COUNT: 12.3 % (ref 10–14.5)
FIBRINOGEN PPP-MCNC: CLEAR MG/DL
GFR SERPLBLD BASED ON 1.73 SQ M-ARVRAT: > 60 ML/MIN
GLUCOSE SERPL-MCNC: 98 MG/DL (ref 70–105)
GLUCOSE UR STRIP-MCNC: NEGATIVE MG/DL
HCT VFR BLD CALC: 41 % (ref 35–52)
HGB BLD-MCNC: 13.5 G/DL (ref 11.5–16)
KETONES UR QL STRIP: NEGATIVE
LEUKOCYTE ESTERASE UR QL STRIP: NEGATIVE
LYMPHOCYTES # BLD AUTO: 2.3 X 10^3 (ref 1–4)
LYMPHOCYTES NFR BLD AUTO: 33 % (ref 12–44)
MANUAL DIFFERENTIAL PERFORMED BLD QL: NO
MCH RBC QN AUTO: 31 PG (ref 25–34)
MCHC RBC AUTO-ENTMCNC: 33 G/DL (ref 32–36)
MCV RBC AUTO: 92 FL (ref 80–99)
METHADONE UR QL SCN: NEGATIVE
METHAMPHETAMINE SCREEN URINE S: POSITIVE
MONOCYTES # BLD AUTO: 0.9 X 10^3 (ref 0–1)
MONOCYTES NFR BLD AUTO: 13 % (ref 0–12)
NEUTROPHILS # BLD AUTO: 3.6 X 10^3 (ref 1.8–7.8)
NEUTROPHILS NFR BLD AUTO: 52 % (ref 42–75)
NITRITE UR QL STRIP: NEGATIVE
OPIATES UR QL SCN: NEGATIVE
OXYCODONE UR QL: NEGATIVE
PH UR STRIP: 6 [PH] (ref 5–9)
PLATELET # BLD: 254 10^3/UL (ref 130–400)
PMV BLD AUTO: 10 FL (ref 7.4–10.4)
POTASSIUM SERPL-SCNC: 3.7 MMOL/L (ref 3.6–5)
PROPOXYPH UR QL: NEGATIVE
PROT SERPL-MCNC: 6.8 GM/DL (ref 6.4–8.2)
PROT UR QL STRIP: NEGATIVE
RBC #/AREA URNS HPF: (no result) /HPF
SALICYLATES SERPL-MCNC: < 5 MG/DL (ref 5–20)
SODIUM SERPL-SCNC: 140 MMOL/L (ref 135–145)
SP GR UR STRIP: >=1.03 (ref 1.02–1.02)
SQUAMOUS #/AREA URNS HPF: (no result) /HPF
TRICYCLICS UR QL SCN: NEGATIVE
WBC # BLD AUTO: 6.8 10^3/UL (ref 4.3–11)
WBC #/AREA URNS HPF: (no result) /HPF

## 2020-01-04 PROCEDURE — 36415 COLL VENOUS BLD VENIPUNCTURE: CPT

## 2020-01-04 PROCEDURE — 93005 ELECTROCARDIOGRAM TRACING: CPT

## 2020-01-04 PROCEDURE — 85025 COMPLETE CBC W/AUTO DIFF WBC: CPT

## 2020-01-04 PROCEDURE — 81000 URINALYSIS NONAUTO W/SCOPE: CPT

## 2020-01-04 PROCEDURE — 51702 INSERT TEMP BLADDER CATH: CPT

## 2020-01-04 PROCEDURE — 80320 DRUG SCREEN QUANTALCOHOLS: CPT

## 2020-01-04 PROCEDURE — 80329 ANALGESICS NON-OPIOID 1 OR 2: CPT

## 2020-01-04 PROCEDURE — 96360 HYDRATION IV INFUSION INIT: CPT

## 2020-01-04 PROCEDURE — 80053 COMPREHEN METABOLIC PANEL: CPT

## 2020-01-04 PROCEDURE — 80306 DRUG TEST PRSMV INSTRMNT: CPT

## 2020-01-04 RX ADMIN — SODIUM CHLORIDE SCH MLS/HR: 900 INJECTION, SOLUTION INTRAVENOUS at 17:21

## 2020-01-04 NOTE — HISTORY & PHYSICIAL
History of Present Illness


History of Present Illness


Reason for visit/HPI


34-year-old female presents to [facility] emergency department after apparently

taking 20 tablets of 50 mg Ultram and approximately 7 tablets of a 1 mg Ativan 

last evening.  She apparently was upset with her  with regards to 

infidelity.  Patient does not have a history of any previous drug overdose and 

she has no current suicidal ideation.


Date of Admission


Jan 4, 2020 at 10:57


Date Seen by a Provider:  Jan 4, 2020


Time Seen by a Provider:  16:10


I consulted on this patient on


1/4/20


 15:54


Attending Physician


Viridiana Rodriguez MD


Admitting Physician





Consult








Allergies and Home Medications


Allergies


Coded Allergies:  


     No Known Drug Allergies (Verified , 12/7/07)





Home Medications


Escitalopram Oxalate 10 Mg Tablet, 10 MG PO DAILY, (Reported)


Sucralfate 1 Gm Tablet, 1 GM PO 4 times a day


   Crush with spoon and make a slurry with water 


   Prescribed by: CASSIUS PÉREZ on 5/4/17 5507





Patient Home Medication List


Home Medication List Reviewed:  Yes





Past Medical-Social-Family Hx


Patient Social History


Marrital Status:  


Number of Children:  3


Alcohol Use:  Denies Use


Recreational Drug Use:  Yes (OCCASSIONAL SMOKES MARIJUANA)


Smoking Status:  Current Everyday Smoker


Type Used:  Cigarettes


Recent Foreign Travel:  No


Contact w/other who traveled:  No


Recent Hopitalizations:  No


Recent Infectious Disease Expo:  No





Immunizations Up To Date


Tetanus Booster (TDap):  Unknown





Seasonal Allergies


Seasonal Allergies:  Yes





Surgeries


Yes ( bilat salpingectomy)


Tubal Ligation





Respiratory


No





Cardiovascular


No





Neurological


Yes


Concussion





Reproductive System


Hx Reproductive Disorders:  Yes (Salpingectomy bilat)


Sexually Transmitted Disease:  No


Female Reproductive Disorders:  Ovarian Cyst


GYN History:  Tubal Ligation





Genitourinary


Yes


Kidney Infection





Gastrointestinal


No


Gastroesophageal Reflux





Musculoskeletal


Yes (chronic left hip pain)


Arthritis





Endocrine


History of Endocrine Disorders:  No





HEENT


History of HEENT Disorders:  No





Cancer


No





Psychosocial


History of Psychiatric Problem:  Yes


Behavioral Health Disorders:  Anxiety





Integumentary


History of Skin or Integumenta:  No





Blood Transfusions


History of Blood Disorders:  No





Family Medical History


Significant Family History:  No Pertinent Family Hx





Review of Systems


Constitutional:  see HPI





Physical Exam


Vital Signs





Vital Signs - First Documented








 1/4/20 1/4/20





 10:05 12:40


 


Temp 35.9 


 


Pulse 63 


 


Resp 20 


 


B/P (MAP) 128/86 (100) 


 


Pulse Ox 95 


 


O2 Delivery  Room Air


 


O2 Flow Rate  98.00





Capillary Refill : Less Than 3 Seconds


Height, Weight, BMI


Height: 5'4.00"


Weight: 182lbs. 0.0oz. 82.377696ku; 31.14 BMI


Method:Stated


General Appearance:  No Apparent Distress


Eyes:  Bilateral Eye Normal Inspection


HEENT:  Moist Mucous Membranes


Neck:  Full Range of Motion


Respiratory:  Lungs Clear


Cardiovascular:  Regular Rate, Rhythm; No No Murmur, No Tachycardia


Gastrointestinal:  Normal Bowel Sounds, Soft; No Distended, No Guarding


Rectal:  Deferred


Back:  Normal Inspection


Extremity:  Normal Capillary Refill


Neurologic/Psychiatric:  Alert, Oriented x3; No Disoriented


Skin:  Normal Color





Assessment/Plan


Assessment and Plan


1.  Drug overdose of tramadol as well as Ativan


-Patient admitted for observation


-Monitor cardiopulmonary status





2.  Depressiontreated in the past


-Will restart antidepressants prior to dismissal or after dismissal





Admission Diagnosis


1.  Drug overdose of tramadol as well as Ativan


2.  Depressiontreated in the past


Admission Status:  Observation


Reason for Inpatient Admission:  


Cardiopulmonary monitoring as recommended by poison control











VIRIDIANA RODRIGUEZ MD               Jan 4, 2020 15:58
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
## 2020-01-04 NOTE — ED PSYCHOSOCIAL
General


Chief Complaint:  Overdose


Stated Complaint:  OVERDOSE


Source:  patient, family


Exam Limitations:  clinical condition





History of Present Illness


Date Seen by Provider:  Jan 4, 2020


Time Seen by Provider:  10:28


Initial Comments


This 34-year-old female presents after she overdosed on twenty 50 mg Ultram 

tablets and seven 1 mg ativan tablets last night. She was fighting with her 

 who is having an affair.





She denies suicidal ideation, other harm to self, current physical abuse from 

her , or similar episode in the past.





The patient is complaining of feeling sleepy but denies other complaint.





Allergies and Home Medications


Allergies


Coded Allergies:  


     No Known Drug Allergies (Verified , 12/7/07)





Home Medications


Escitalopram Oxalate 10 Mg Tablet, 10 MG PO DAILY, (Reported)


Sucralfate 1 Gm Tablet, 1 GM PO 4 times a day


   Crush with spoon and make a slurry with water 


   Prescribed by: CASSIUS PÉREZ on 5/4/17 7148





Patient Home Medication List


Home Medication List Reviewed:  Yes





Review of Systems


Constitutional:  chills, fever


EENTM:  no symptoms reported; No hearing loss


Respiratory:  no symptoms reported


Cardiovascular:  no symptoms reported


Gastrointestinal:  No abdominal pain, No nausea, No vomiting


Genitourinary:  no symptoms reported


Musculoskeletal:  no symptoms reported


Skin:  no symptoms reported


Psychiatric/Neurological:  No Symptoms Reported





Past Medical-Social-Family Hx


Past Med/Social Hx:  Reviewed Nursing Past Med/Soc Hx


Patient Social History


Type Used:  Cigarettes


Recent Foreign Travel:  No


Contact w/Someone Who Travel:  No


Recent Hopitalizations:  No





Immunizations Up To Date


Tetanus Booster (TDap):  Unknown





Seasonal Allergies


Seasonal Allergies:  Yes





Past Medical History


Surgeries:  Yes ( bilat salpingectomy)


Tubal Ligation


Respiratory:  No


Cardiac:  No


Neurological:  Yes


Concussion


Reproductive Disorders:  Yes (Salpingectomy bilat)


Female Reproductive Disorders:  Ovarian Cyst


GYN History:  Tubal Ligation


Sexually Transmitted Disease:  No


Genitourinary:  Yes


Kidney Infection


Gastrointestinal:  No


Gastroesophageal Reflux


Musculoskeletal:  Yes (chronic left hip pain)


Arthritis


Endocrine:  No


HEENT:  No


Cancer:  No


Psychosocial:  No


Integumentary:  No


Blood Disorders:  No





Family Medical History


No Pertinent Family Hx





Physical Exam





Vital Signs - First Documented








 1/4/20





 10:05


 


Temp 35.9


 


Pulse 63


 


Resp 20


 


B/P (MAP) 128/86 (100)


 


Pulse Ox 95





Capillary Refill :


Height, Weight, BMI


Height: 5'4.00"


Weight: 182lbs. 0.0oz. 82.414472my; 31.1 BMI


Method:Stated


General Appearance:  WD/WN, no apparent distress, other (patient is sleepy but 

arouses easily, articulates normally, and is able Lauffer helpful information.)


HEENT:  normal ENT inspection


Neck:  non-tender, full range of motion


Respiratory:  lungs clear


Cardiovascular:  regular rate, rhythm


Gastrointestinal:  normal bowel sounds


Extremities:  normal range of motion


Neurologic/Psychiatric:  no motor/sensory deficits, normal mood/affect


Appearance/Memory:  appropriate appearance, neat


Behavior/Eye Contact:  cooperative, good eye contact, other (depressed affect)


Thoughts/Hallucinations:  normal thought pattern, no apparent hallucination; No 

auditory hallucinations, No delusions


Skin:  normal color, warm/dry





Progress/Results/Core Measures


Results/Orders


Lab Results





Laboratory Tests








Test


 1/4/20


10:10 Range/Units


 


 


White Blood Count


 6.8 


 4.3-11.0


10^3/uL


 


Red Blood Count


 4.43 


 4.35-5.85


10^6/uL


 


Hemoglobin 13.5  11.5-16.0  G/DL


 


Hematocrit 41  35-52  %


 


Mean Corpuscular Volume 92  80-99  FL


 


Mean Corpuscular Hemoglobin 31  25-34  PG


 


Mean Corpuscular Hemoglobin


Concent 33 


 32-36  G/DL





 


Red Cell Distribution Width 12.3  10.0-14.5  %


 


Platelet Count


 254 


 130-400


10^3/uL


 


Mean Platelet Volume 10.0  7.4-10.4  FL


 


Neutrophils (%) (Auto) 52  42-75  %


 


Lymphocytes (%) (Auto) 33  12-44  %


 


Monocytes (%) (Auto) 13 H 0-12  %


 


Eosinophils (%) (Auto) 2  0-10  %


 


Basophils (%) (Auto) 0  0-10  %


 


Neutrophils # (Auto) 3.6  1.8-7.8  X 10^3


 


Lymphocytes # (Auto) 2.3  1.0-4.0  X 10^3


 


Monocytes # (Auto) 0.9  0.0-1.0  X 10^3


 


Eosinophils # (Auto)


 0.1 


 0.0-0.3


10^3/uL


 


Basophils # (Auto)


 0.0 


 0.0-0.1


10^3/uL








My Orders





Orders - REANNA STROUD MD


Ekg Tracing (1/4/20 10:14)


Drug Screen Stat (Urine) (1/4/20 10:23)


Cbc With Automated Diff (1/4/20 10:23)


Comprehensive Metabolic Panel (1/4/20 10:23)


Ua Culture If Indicated (1/4/20 10:23)


Acetaminophen (1/4/20 10:23)


Salicylate (1/4/20 10:23)


Alcohol (1/4/20 10:23)


Ns Iv 1000 Ml (Sodium Chloride 0.9%) (1/4/20 10:30)





Vital Signs/I&O











 1/4/20





 10:05


 


Temp 35.9


 


Pulse 63


 


Resp 20


 


B/P (MAP) 128/86 (100)


 


Pulse Ox 95











Progress


Progress Note :  


   Time:  10:59


Progress Note


The patient's EKG demonstrated a sinus rhythm without an acute current of injury

or dysrhythmia.





Appropriate laboratory evaluations been initiated.





Consultation poison control was undertaken.  The recommendation was for 

observation the patient.





Telephone consultation with  was undertaken.  Patient was admitted to a 

telemetry bed for further observation.


Initial ECG Impression Date:  Jan 4, 2020





Departure


Communication (Admissions)


Time/Spoke to Admitting Phy:  11:03








Impression





   Primary Impression:  


   Drug overdose


   Qualified Codes:  T50.904A - Poisoning by unspecified drugs, medicaments and 

   biological substances, undetermined, initial encounter


Disposition:  09 ADMITTED AS INPATIENT


Condition:  Unchanged





Admissions


Decision to Admit Reason:  Admit from ER (General)


Decision to Admit/Date:  Jan 4, 2020


Time/Decision to Admit Time:  11:04





Departure-Patient Inst.


Patient Instructions:  ALCOHOL AND SUBSTANCE ABUSE











REANNA STROUD MD              Jan 4, 2020 10:36

## 2020-01-05 VITALS — SYSTOLIC BLOOD PRESSURE: 107 MMHG | DIASTOLIC BLOOD PRESSURE: 67 MMHG

## 2020-01-05 VITALS — SYSTOLIC BLOOD PRESSURE: 92 MMHG | DIASTOLIC BLOOD PRESSURE: 53 MMHG

## 2020-01-05 VITALS — DIASTOLIC BLOOD PRESSURE: 71 MMHG | SYSTOLIC BLOOD PRESSURE: 109 MMHG

## 2020-01-05 LAB
ALBUMIN SERPL-MCNC: 3.6 GM/DL (ref 3.2–4.5)
ALP SERPL-CCNC: 40 U/L (ref 40–136)
ALT SERPL-CCNC: 10 U/L (ref 0–55)
BASOPHILS # BLD AUTO: 0 10^3/UL (ref 0–0.1)
BASOPHILS NFR BLD AUTO: 0 % (ref 0–10)
BILIRUB SERPL-MCNC: 1 MG/DL (ref 0.1–1)
BUN/CREAT SERPL: 13
CALCIUM SERPL-MCNC: 8.1 MG/DL (ref 8.5–10.1)
CHLORIDE SERPL-SCNC: 107 MMOL/L (ref 98–107)
CO2 SERPL-SCNC: 23 MMOL/L (ref 21–32)
CREAT SERPL-MCNC: 0.63 MG/DL (ref 0.6–1.3)
EOSINOPHIL # BLD AUTO: 0.1 10^3/UL (ref 0–0.3)
EOSINOPHIL NFR BLD AUTO: 3 % (ref 0–10)
ERYTHROCYTE [DISTWIDTH] IN BLOOD BY AUTOMATED COUNT: 12.2 % (ref 10–14.5)
GFR SERPLBLD BASED ON 1.73 SQ M-ARVRAT: > 60 ML/MIN
GLUCOSE SERPL-MCNC: 87 MG/DL (ref 70–105)
HCT VFR BLD CALC: 36 % (ref 35–52)
HGB BLD-MCNC: 11.9 G/DL (ref 11.5–16)
LYMPHOCYTES # BLD AUTO: 2 X 10^3 (ref 1–4)
LYMPHOCYTES NFR BLD AUTO: 35 % (ref 12–44)
MANUAL DIFFERENTIAL PERFORMED BLD QL: NO
MCH RBC QN AUTO: 31 PG (ref 25–34)
MCHC RBC AUTO-ENTMCNC: 33 G/DL (ref 32–36)
MCV RBC AUTO: 94 FL (ref 80–99)
MONOCYTES # BLD AUTO: 0.7 X 10^3 (ref 0–1)
MONOCYTES NFR BLD AUTO: 12 % (ref 0–12)
NEUTROPHILS # BLD AUTO: 2.8 X 10^3 (ref 1.8–7.8)
NEUTROPHILS NFR BLD AUTO: 50 % (ref 42–75)
PLATELET # BLD: 184 10^3/UL (ref 130–400)
PMV BLD AUTO: 9.8 FL (ref 7.4–10.4)
POTASSIUM SERPL-SCNC: 3.4 MMOL/L (ref 3.6–5)
PROT SERPL-MCNC: 5.6 GM/DL (ref 6.4–8.2)
SODIUM SERPL-SCNC: 137 MMOL/L (ref 135–145)
WBC # BLD AUTO: 5.5 10^3/UL (ref 4.3–11)

## 2020-01-05 RX ADMIN — SODIUM CHLORIDE SCH MLS/HR: 900 INJECTION, SOLUTION INTRAVENOUS at 00:41

## 2020-01-05 NOTE — NUR
FLORENTINO JUAN WAS CALLED -- [phone] -- GAVE THEM PT INFO AND THEY VOICED THEY WOULD TALK TO 
[facility] AND GAVE HIM THE FLOOR NUMBER   -- HE VOICED THAT [facility] WOULD BE CALLING

## 2020-01-05 NOTE — PROGRESS NOTE
Subjective


Date Seen by a Provider:  2020


Time Seen by a Provider:  06:25


Subjective/Events-last exam


Patient more alert this morning and communicating without falling asleep.  She 

would like to have regular diet as she is hungry.  She does currently have a 

Flores catheter in and would like to have it removed.  She informs me that 

yesterday was not a suicide attempt but more of a frustration due to her 

"nagging her."





Objective


Exam





Vital Signs








  Date Time  Temp Pulse Resp B/P (MAP) Pulse Ox O2 Delivery O2 Flow Rate FiO2


 


20 03:47 36.0 67 18 107/67 (80) 97 Room Air  


 


20 01:00  62      


 


20 23:36 36.4 80 18 96/51 (66) 94 Room Air  


 


20 20:30 36.6 67 16 97/64 (75) 97 Room Air  


 


20 20:00      Room Air  


 


20 19:00  73      


 


20 15:55 36.7 69 16 110/76 (87) 97 Room Air  


 


20 14:13  69      


 


20 12:43 36.4 72 20 118/78 98 Room Air  


 


20 12:40     97 Room Air 98.00 


 


20 12:35 35.9 70 20 113/73 (100) 98   


 


20 10:05 35.9 63 20 128/86 (100) 95   














I & O 


 


 20





 07:00


 


Intake Total 1300 ml


 


Output Total 650 ml


 


Balance 650 ml





Capillary Refill : Less Than 3 Seconds


General Appearance:  No Apparent Distress


Neck:  Non Tender


Respiratory:  Lungs Clear


Cardiovascular:  Regular Rate, Rhythm


Gastrointestinal:  soft





Results


Lab


Laboratory Tests


20 10:10: 


White Blood Count 6.8, Red Blood Count 4.43, Hemoglobin 13.5, Hematocrit 41, 

Mean Corpuscular Volume 92, Mean Corpuscular Hemoglobin 31, Mean Corpuscular 

Hemoglobin Concent 33, Red Cell Distribution Width 12.3, Platelet Count 254, 

Mean Platelet Volume 10.0, Neutrophils (%) (Auto) 52, Lymphocytes (%) (Auto) 33,

Monocytes (%) (Auto) 13H, Eosinophils (%) (Auto) 2, Basophils (%) (Auto) 0, 

Neutrophils # (Auto) 3.6, Lymphocytes # (Auto) 2.3, Monocytes # (Auto) 0.9, 

Eosinophils # (Auto) 0.1, Basophils # (Auto) 0.0, Urine Color YELLOW, Urine 

Clarity CLEAR, Urine pH 6.0, Urine Specific Gravity >=1.030, Urine Protein 

NEGATIVE, Urine Glucose (UA) NEGATIVE, Urine Ketones NEGATIVE, Urine Nitrite 

NEGATIVE, Urine Bilirubin NEGATIVE, Urine Urobilinogen 1.0, Urine Leukocyte 

Esterase NEGATIVE, Urine RBC (Auto) NEGATIVE, Urine RBC NONE, Urine WBC NONE, 

Urine Squamous Epithelial Cells RARE, Urine Crystals NONE, Urine Bacteria 

NEGATIVE, Urine Casts NONE, Urine Mucus NEGATIVE, Urine Culture Indicated NO, 

Sodium Level 140, Potassium Level 3.7, Chloride Level 108H, Carbon Dioxide Level

21, Anion Gap 11, Blood Urea Nitrogen 10, Creatinine 0.67, Estimat Glomerular 

Filtration Rate > 60, BUN/Creatinine Ratio 15, Glucose Level 98, Calcium Level 

9.0, Corrected Calcium 8.7, Total Bilirubin 1.1H, Aspartate Amino Transf 

(AST/SGOT) 14, Alanine Aminotransferase (ALT/SGPT) 11, Alkaline Phosphatase 53, 

Total Protein 6.8, Albumin 4.4, Salicylates Level < 5.0L, Urine Opiates Screen 

NEGATIVE, Urine Oxycodone Screen NEGATIVE, Urine Methadone Screen NEGATIVE, 

Urine Propoxyphene Screen NEGATIVE, Acetaminophen Level < 10L, Urine 

Barbiturates Screen NEGATIVE, Ur Tricyclic Antidepressants Screen NEGATIVE, Urin

e Phencyclidine Screen NEGATIVE, Urine Amphetamines Screen POSITIVEH, Urine 

Methamphetamines Screen POSITIVEH, Urine Benzodiazepines Screen POSITIVEH, Urine

Cocaine Screen NEGATIVE, Urine Cannabinoids Screen POSITIVEH, Serum Alcohol < 10


20 05:37: 


White Blood Count 5.5, Red Blood Count 3.86L, Hemoglobin 11.9, Hematocrit 36, 

Mean Corpuscular Volume 94, Mean Corpuscular Hemoglobin 31, Mean Corpuscular 

Hemoglobin Concent 33, Red Cell Distribution Width 12.2, Platelet Count 184, 

Mean Platelet Volume 9.8, Neutrophils (%) (Auto) 50, Lymphocytes (%) (Auto) 35, 

Monocytes (%) (Auto) 12, Eosinophils (%) (Auto) 3, Basophils (%) (Auto) 0, Ne

utrophils # (Auto) 2.8, Lymphocytes # (Auto) 2.0, Monocytes # (Auto) 0.7, 

Eosinophils # (Auto) 0.1, Basophils # (Auto) 0.0, Sodium Level 137, Potassium 

Level 3.4L, Chloride Level 107, Carbon Dioxide Level 23, Anion Gap 7, Blood Urea

Nitrogen 8, Creatinine 0.63, Estimat Glomerular Filtration Rate > 60, 

BUN/Creatinine Ratio 13, Glucose Level 87, Calcium Level 8.1L, Corrected Calcium

8.4L, Total Bilirubin 1.0, Aspartate Amino Transf (AST/SGOT) 11, Alanine 

Aminotransferase (ALT/SGPT) 10, Alkaline Phosphatase 40, Total Protein 5.6L, 

Albumin 3.6





Assessment/Plan


Assessment/Plan


Assess & Plan/Chief Complaint


1.  Drug overdose of tramadol as well as Ativan


-Patient admitted for observation


-Monitor cardiopulmonary status





-Patient more alert and coherent this morning.  Will discontinue the Flores 

catheter and allow her to eat regular diet.


- consultation





2.  Depressiontreated in the past


-Will restart antidepressants prior to dismissal or after dismissal





-Most likely this will be started outpatient





Clinical Quality Measures


Admission Status


Admission Dx


1.  Drug overdose of tramadol as well as Ativan


2.  Depressiontreated in the past





DVT/VTE Risk/Contraindication:


Risk Factor Score Per Nursin


RFS Level Per Nursing on Admit:  1=Low/No VTE PPX











VIRIDIANA TAYLOR MD               2020 06:41

## 2020-01-05 NOTE — NUR
POISON CONTROL CALLED ABOUT PT -- PT MORE ALERT -- SOMEWHAT FATIGUED -- VSS   -- POISON 
CONTROL HAS SIGNED OFF THE CASE BUT IF THEY ARE NEEDED TO CALL THEM --

## 2023-02-14 ENCOUNTER — HOSPITAL ENCOUNTER (OUTPATIENT)
Dept: HOSPITAL 75 - RAD | Age: 38
End: 2023-02-14
Attending: FAMILY MEDICINE
Payer: MEDICAID

## 2023-02-14 DIAGNOSIS — M25.561: Primary | ICD-10-CM

## 2023-02-14 PROCEDURE — 73562 X-RAY EXAM OF KNEE 3: CPT

## 2023-02-14 NOTE — DIAGNOSTIC IMAGING REPORT
INDICATION: 

Right knee pain post fall.



TECHNIQUE: 

AP, oblique, and lateral views of the right knee were obtained.



FINDINGS:

No fracture or acute bony abnormality is seen. The joint spaces

are unremarkable.



IMPRESSION:

Negative right knee.



Dictated by: 



  Dictated on workstation # [id number]

## 2023-02-27 ENCOUNTER — HOSPITAL ENCOUNTER (EMERGENCY)
Dept: HOSPITAL 75 - ER | Age: 38
Discharge: HOME | End: 2023-02-27
Payer: MEDICAID

## 2023-02-27 VITALS — DIASTOLIC BLOOD PRESSURE: 75 MMHG | SYSTOLIC BLOOD PRESSURE: 99 MMHG

## 2023-02-27 VITALS — WEIGHT: 229.28 LBS | BODY MASS INDEX: 38.2 KG/M2 | HEIGHT: 64.96 IN

## 2023-02-27 DIAGNOSIS — Z28.310: ICD-10-CM

## 2023-02-27 DIAGNOSIS — J40: Primary | ICD-10-CM

## 2023-02-27 DIAGNOSIS — Z20.822: ICD-10-CM

## 2023-02-27 PROCEDURE — 87636 SARSCOV2 & INF A&B AMP PRB: CPT

## 2023-02-27 PROCEDURE — 99283 EMERGENCY DEPT VISIT LOW MDM: CPT

## 2023-02-27 NOTE — ED COUGH/URI
General


Chief Complaint:  Cough/Cold/Flu Symptoms


Stated Complaint:  COUGH | CHEST CONGESTION | SORE THROAT


Source:  patient


Exam Limitations:  no limitations





History of Present Illness


Date Seen by Provider:  Feb 27, 2023


Time Seen by Provider:  08:00


Initial Comments


Patient is a 37-year-old female who presents to the emergency room with a chief 

complaint of cough, congestion, runny nose, earache, sore throat.  She is not 

COVID or flu vaccinated.  She does not take any daily medications nor have to 

use inhalers.  Smoking history.  Took some DayQuil today.  Has had contact with 

her 1-year-old child who has had similar symptoms for the past 4 days.  States 

her cough is productive.  No abdominal pain, nausea or vomiting.  No GI or  

symptoms.  No rashes.  No allergies to medications.


Timing/Duration:  other (3 days)


Severity/Quality:  moderate, sputum


Prior Episodes/Possible Cause:  occasional episodes


Associated Symptoms:  cough, headache, nasal congestion, nasal drainage





Allergies and Home Medications


Allergies


Coded Allergies:  


     No Known Drug Allergies (Verified , 12/7/07)





Patient Home Medication List


Home Medication List Reviewed:  Yes


No Active Prescriptions or Reported Meds





Review of Systems


Review of Systems


Constitutional:  see HPI


EENTM:  nose congestion, throat pain


Respiratory:  cough, phlegm


Cardiovascular:  no symptoms reported


Gastrointestinal:  no symptoms reported


Genitourinary:  no symptoms reported


Musculoskeletal:  no symptoms reported


Skin:  no symptoms reported


Psychiatric/Neurological:  Headache





All Other Systems Reviewed


Negative Unless Noted:  Yes





Past Medical-Social-Family Hx


Immunizations Up To Date


Tetanus Booster (TDap):  Unknown





Seasonal Allergies


Seasonal Allergies:  Yes





Past Medical History


Surgeries:  Yes ( bilat salpingectomy)


Tubal Ligation


Respiratory:  No


Cardiac:  No


Neurological:  Yes


Concussion


Reproductive Disorders:  Yes (Salpingectomy bilat)


Female Reproductive Disorders:  Ovarian Cyst


GYN History:  Tubal Ligation


Sexually Transmitted Disease:  No


Genitourinary:  Yes


Kidney Infection


Gastrointestinal:  Yes


Gastroesophageal Reflux


Musculoskeletal:  Yes (chronic left hip pain)


Arthritis


Endocrine:  No


HEENT:  No


Cancer:  No


Psychosocial:  Yes


Anxiety


Integumentary:  No


Blood Disorders:  No





Family Medical History


No Pertinent Family Hx





Physical Exam





Vital Signs - First Documented








 2/27/23





 08:00


 


Temp 36.3


 


Pulse 88


 


Resp 18


 


B/P (MAP) 99/75 (83)


 


O2 Delivery Room Air





Capillary Refill :


Height: 5'4.00"


Weight: 182lbs. 0.0oz. 82.388335gs; 31.14 BMI


Method:Stated


General Appearance:  WD/WN, no apparent distress


HEENT:  normal ENT inspection, TMs normal, pharynx normal


Neck:  full range of motion, lymphadenopathy (R), lymphadenopathy (L)


Respiratory:  lungs clear, normal breath sounds, no respiratory distress, no 

accessory muscle use


Cardiovascular:  regular rate, rhythm


Extremities:  normal range of motion


Neurologic/Psychiatric:  no motor/sensory deficits, alert, normal mood/affect, 

oriented x 3


Skin:  normal color, warm/dry





Progress/Results/Core Measures


Suspected Sepsis


SIRS


Temperature: 


Pulse:  


Respiratory Rate: 


 


Blood Pressure  / 


Mean:





Results/Orders


Lab Results





Laboratory Tests








Test


 2/27/23


08:20 Range/Units


 


 


Influenza Type A (RT-PCR) Not Detected  Not Detecte  


 


Influenza Type B (RT-PCR) Not Detected  Not Detecte  


 


SARS-CoV-2 RNA (RT-PCR) Not Detected  Not Detecte  








My Orders





Orders - JAROCHO LOPEZ MD


Covid 19 Inhouse Test (2/27/23 08:27)


Influenza A And B By Pcr (2/27/23 08:27)


Isolation Central Supply Req (2/27/23 08:27)





Vital Signs/I&O











 2/27/23





 08:00


 


Temp 36.3


 


Pulse 88


 


Resp 18


 


B/P (MAP) 99/75 (83)


 


O2 Delivery Room Air





Capillary Refill :


Counseling-Symptomatic:  3-10 Minutes


Follow-up with PCP to:  Discuss Further Options





Departure


Impression





   Primary Impression:  


   Bronchitis


Disposition:  01 HOME, SELF-CARE


Condition:  Stable





Departure-Patient Inst.


Decision time for Depature:  09:26


Referrals:  


VIRIDIANA TAYLOR MD (PCP/Family)


Primary Care Physician


Patient Instructions:  Bronchitis, Adult ED





Add. Discharge Instructions:  


Drink plenty of fluids to stay well-hydrated.





Continue to take over-the-counter DayQuil/NyQuil for symptoms.





You can also take over-the-counter ibuprofen 3 tablets which is 600 mg every 6 

hours with food as needed for body aches and fever.





Take the antibiotic, azithromycin as directed daily for the next 5 days.





Return to the emergency department for any new, concerning or emergent 

complaints.  You really should try to decrease the amount you are smoking 

especially while you are sick.


Scripts


Azithromycin (Azithromycin) 250 Mg Tablet


250 MG PO UD, #6 TAB


   TAKE 2 TABLETS ON DAY ONE THEN TAKE 1 TABLET DAILY FOR FOUR MORE DAYS


   Prov: JAROCHO LOPEZ MD         2/27/23





Copy


Copies To 1:   VIRIDIANA TAYLOR MD, KATHRYN M MD         Feb 27, 2023 08:24